# Patient Record
Sex: FEMALE | Race: WHITE | NOT HISPANIC OR LATINO | Employment: OTHER | URBAN - METROPOLITAN AREA
[De-identification: names, ages, dates, MRNs, and addresses within clinical notes are randomized per-mention and may not be internally consistent; named-entity substitution may affect disease eponyms.]

---

## 2017-12-15 ENCOUNTER — CONVERSION ENCOUNTER (OUTPATIENT)
Dept: MAMMOGRAPHY | Facility: HOSPITAL | Age: 57
End: 2017-12-15

## 2019-08-23 ENCOUNTER — HOSPITAL ENCOUNTER (OUTPATIENT)
Dept: OTHER | Facility: HOSPITAL | Age: 59
Discharge: HOME | End: 2019-08-23
Attending: NURSE PRACTITIONER

## 2019-12-13 ENCOUNTER — CONVERSION ENCOUNTER (OUTPATIENT)
Dept: SURGERY | Facility: CLINIC | Age: 59
End: 2019-12-13

## 2019-12-13 ENCOUNTER — OFFICE VISIT CONVERTED (OUTPATIENT)
Dept: SURGERY | Facility: CLINIC | Age: 59
End: 2019-12-13
Attending: PHYSICIAN ASSISTANT

## 2020-02-05 ENCOUNTER — OFFICE VISIT CONVERTED (OUTPATIENT)
Dept: UROLOGY | Facility: CLINIC | Age: 60
End: 2020-02-05
Attending: UROLOGY

## 2020-02-26 ENCOUNTER — HOSPITAL ENCOUNTER (OUTPATIENT)
Dept: PREADMISSION TESTING | Facility: HOSPITAL | Age: 60
Discharge: HOME | End: 2020-02-26
Attending: UROLOGY

## 2020-03-06 ENCOUNTER — HOSPITAL ENCOUNTER (OUTPATIENT)
Dept: PERIOP | Facility: HOSPITAL | Age: 60
Setting detail: HOSPITAL OUTPATIENT SURGERY
Discharge: HOME | End: 2020-03-06
Attending: UROLOGY

## 2020-03-11 ENCOUNTER — OFFICE VISIT CONVERTED (OUTPATIENT)
Dept: UROLOGY | Facility: CLINIC | Age: 60
End: 2020-03-11
Attending: UROLOGY

## 2020-03-11 ENCOUNTER — CONVERSION ENCOUNTER (OUTPATIENT)
Dept: SURGERY | Facility: CLINIC | Age: 60
End: 2020-03-11

## 2020-04-08 ENCOUNTER — TELEMEDICINE CONVERTED (OUTPATIENT)
Dept: UROLOGY | Facility: CLINIC | Age: 60
End: 2020-04-08
Attending: UROLOGY

## 2020-09-01 ENCOUNTER — HOSPITAL ENCOUNTER (OUTPATIENT)
Dept: OTHER | Facility: HOSPITAL | Age: 60
Discharge: HOME | End: 2020-09-01
Attending: NURSE PRACTITIONER

## 2020-09-14 ENCOUNTER — OFFICE VISIT CONVERTED (OUTPATIENT)
Dept: UROLOGY | Facility: CLINIC | Age: 60
End: 2020-09-14
Attending: UROLOGY

## 2020-11-30 ENCOUNTER — TELEPHONE CONVERTED (OUTPATIENT)
Dept: UROLOGY | Facility: CLINIC | Age: 60
End: 2020-11-30
Attending: UROLOGY

## 2021-03-19 ENCOUNTER — HOSPITAL ENCOUNTER (OUTPATIENT)
Dept: VACCINE CLINIC | Facility: HOSPITAL | Age: 61
Discharge: HOME | End: 2021-03-19
Attending: INTERNAL MEDICINE

## 2021-04-16 ENCOUNTER — HOSPITAL ENCOUNTER (OUTPATIENT)
Dept: VACCINE CLINIC | Facility: HOSPITAL | Age: 61
Discharge: HOME | End: 2021-04-16
Attending: INTERNAL MEDICINE

## 2021-05-12 NOTE — PROGRESS NOTES
Quick Note      Patient Name: Shahnaz Davis   Patient ID: [id number]   Sex: Female   YOB: 1960    Primary Care Provider: Jone Gorman MD   Referring Provider: Poly Contreras PA-C    Visit Date: April 8, 2020    Provider: Ryanne Cristobal MD   [facility]   Location Address: [address] [place] [address]  [id number]   Location Phone: [phone]          History Of Present Illness  The patient is a 60 year old /White female who returns for a routine post-operative visit after undergoing transobturator sling insertion for urinary incontinence and on 03/06/2020. Since the patient's surgery, she complains of no significant symptoms or problems since the surgery.   The patient is continent. She recently has returned to full daily activities.   TELEHEALTH VISIT  Chief Complaint: s/p transobturator sling   Shahnaz Davis is a 60 year old /White female who is presenting for evaluation via telehealth visit. Verbal consent obtained before beginning visit.   Provider spent 7 minutes with the patient during telehealth visit.   The following staff were present during this visit: Sinai Higginbotham and Ryanne Cristobal MD           Assessment  · VIDAL (stress urinary incontinence, female)     625.6/N39.3      Plan  · Orders  o Physican Telephone evaluation, 5-10 min (72048) - 625.6/N39.3 - 04/08/2020  · Medications  o Medications have been Reconciled  o Transition of Care or Provider Policy  · Instructions  o Discussion and Plan: The patient has done well after surgery with no apparent complications. I have discussed the postoperative course to date, as well as, the expected progress going forward. The patient understands what complications to be concerned about. I will see the patient in routine follow-up, or sooner if needed.  o FOLLOW-UP:  o In 3 weeks  o In 6 months  o Plan Of Care:   o Chronic conditions reviewed and taken into consideration for today's treatment  plan.  o Patient instructed to seek medical attention urgently for new or worsening symptoms.  o Patient was educated/instructed on their diagnosis, treatment and medications prior to discharge from the clinic today.  o Discussed Covid-19 precautions including, but not limited to, social distancing, avoid touching your face, and hand washing.             Electronically Signed by: Ryanne Cristobal MD -Author on April 8, 2020 12:09:42 PM

## 2021-05-13 NOTE — PROGRESS NOTES
Progress Note      Patient Name: Shahnaz Davis   Patient ID: [id number]   Sex: Female   YOB: 1960    Primary Care Provider: Jone Gorman MD   Referring Provider: Ryanne Cristobal MD    Visit Date: November 30, 2020    Provider: Ryanne Cristobal MD   Location: [facility]   Location Address: [address] [place] [address]  [id number]   Location Phone: [phone]          History Of Present Illness  The patient is a 60 year old /White female who returns for a routine post-operative visit after undergoing transobturator sling insertion for urinary incontinence and on 03/06/2020. Since the patient's surgery, she complains of the following: continued leakage with physical exertion. She states it is not any better than it was before surgery.   The patient is incontinent. The incontinence is worsening. The patient uses 2 pads a day. She states since last visit, she has returned to full daily activities.      She had UDS done and it showed stress urinary incontinence with a LPP of 87.      She is considerably bothered by her leakage and is possibly interested in another procedure.      The patient had an 8 minute phone visit today and Sinai Higginbotham was present.  Consent was obtained prior to beginning the visit.       Past Medical History  Arthritis; chronic back pain; Constipation; COVID-19, history of; VIDAL (stress urinary incontinence, female)         Past Surgical History  Appendectomy; Colonoscopy; Cystoscopy with urethral sling procedure; Hysterectomy         Medication List  Calcium 500 + D 500 mg(1,250mg) -200 unit oral tablet; estradiol 0.01 % (0.1 mg/gram) vaginal cream; melatonin oral; oxycodone-acetaminophen 5-325 mg oral tablet; Premarin 0.9 mg oral tablet; Unisom SleepGels 50 mg oral capsule; vitamin B12-folic acid 1,000-400 mcg sublingual lozenge         Allergy List  NO KNOWN DRUG ALLERGIES       Allergies Reconciled  Family Medical History  Diabetes,  unspecified type; Prostate cancer         Social History  Alcohol (Current some day); Caffeine (Current every day); Second hand smoke exposure (Never); Tobacco (Former)                 Assessment  · VIDAL (stress urinary incontinence, female)     625.6/N39.3      Plan  · Orders  o Physican Telephone evaluation, 5-10 min (58860) - 625.6/N39.3 - 11/30/2020  · Medications  o Medications have been Reconciled  o Transition of Care or Provider Policy  · Instructions  o I sent her info on coaptate periurethral injection. She will let me know if she decides she wants to do that.  o FOLLOW-UP:  o In 6 months            Electronically Signed by: Ryanne Cristobal MD -Author on November 30, 2020 03:52:24 PM

## 2021-05-13 NOTE — PROGRESS NOTES
Progress Note      Patient Name: Shahnaz Davis   Patient ID: [id number]   Sex: Female   YOB: 1960    Primary Care Provider: Jone Gorman MD   Referring Provider: Poly Conrteras PA-C    Visit Date: September 14, 2020    Provider: Ryanne Cristobal MD   Location: [facility]   Location Address: [address] [place] [address]  [id number]   Location Phone: [phone]          Chief Complaint  · pt here for urologic issues      History Of Present Illness  The patient is a 60 year old /White female who returns for a routine post-operative visit after undergoing transobturator sling insertion for urinary incontinence and on 03/06/2020. Since the patient's surgery, she complains of the following: continued leakage with physical exertion. She states it is not any better than it was before surgery.   The patient is incontinent. The incontinence is worsening. The patient uses 2 pads a day. She states since last visit, she has returned to full daily activities.       Past Medical History  Arthritis; chronic back pain; Constipation; VIDAL (stress urinary incontinence, female)         Past Surgical History  Appendectomy; Colonoscopy; Cystoscopy with urethral sling procedure; Hysterectomy         Medication List  estradiol 0.01 % (0.1 mg/gram) vaginal cream; melatonin oral; oxycodone-acetaminophen 5-325 mg oral tablet; Premarin 0.9 mg oral tablet; Unisom SleepGels 50 mg oral capsule; vitamin B12-folic acid 1,000-400 mcg sublingual lozenge         Allergy List  NO KNOWN DRUG ALLERGIES       Allergies Reconciled  Family Medical History  Diabetes, unspecified type; Prostate cancer         Social History  Alcohol (Current some day); Caffeine (Current every day); Second hand smoke exposure (Never); Tobacco (Former)         Review of Systems  · Constitutional  o Denies  o : chills, fever  · Gastrointestinal  o Denies  o : nausea, vomiting, flank pain      Vitals  Date Time BP Position Site L\R  "Cuff Size HR RR TEMP (F) WT  HT  BMI kg/m2 BSA m2 O2 Sat HC       09/14/2020 09:58 /75 Sitting       137lbs 6oz 5'  2\" 25.13 1.65           Physical Examination  · Constitutional  o Appearance  o : well-nourished, well developed, alert, in no acute distress, normal body habitus          Results  · In-Office Procedures  o Lab procedure  § Automated dipstick urinalysis with microscopy (22665)   § Color Ur: Yellow   § Clarity Ur: Clear   § Glucose Ur Ql Strip: Negative   § Bilirub Ur Ql Strip: Negative   § Ketones Ur Ql Strip: Negative   § Sp Gr Ur Qn: 1.020   § Hgb Ur Ql Strip: Trace-intact   § pH Ur-LsCnc: 5.5   § Prot Ur Ql Strip: Negative   § Urobilinogen Ur Strip-mCnc: 0.2   § Nitrite Ur Ql Strip: Negative   § WBC Est Ur Ql Strip: Negative   § RBC UrnS Qn HPF: 0   § WBC UrnS Qn HPF: 0   § Bacteria UrnS Qn HPF: 0   § Crystals UrnS Qn HPF: 0   § Epithelial Cells (non renal): 0 /HPF  § Epithelial Cells (renal): 0       Assessment  · VIDAL (stress urinary incontinence, female)     625.6/N39.3      Plan  · Orders  o Complex cystometrogram with voiding pressure studies and UPP (46606) - 625.6/N39.3 - 09/14/2020  o Intraabdominal Pressure Test (59557) - 625.6/N39.3 - 09/14/2020  o Electromyography Anal Sphinct. (84298) - 625.6/N39.3 - 09/14/2020  · Medications  o Medications have been Reconciled  o Transition of Care or Provider Policy  · Instructions  o She will have UDS done and I will see her back after that.  o Electronically Identified Patient Education Materials Provided Electronically            Electronically Signed by: Ryanne Cristobal MD -Author on September 14, 2020 12:32:32 PM  "

## 2021-05-14 VITALS
BODY MASS INDEX: 25.28 KG/M2 | WEIGHT: 137.37 LBS | SYSTOLIC BLOOD PRESSURE: 116 MMHG | DIASTOLIC BLOOD PRESSURE: 75 MMHG | HEIGHT: 62 IN

## 2021-05-15 VITALS
WEIGHT: 136 LBS | HEIGHT: 62 IN | BODY MASS INDEX: 25.03 KG/M2 | DIASTOLIC BLOOD PRESSURE: 73 MMHG | SYSTOLIC BLOOD PRESSURE: 122 MMHG

## 2021-05-15 VITALS
WEIGHT: 136 LBS | BODY MASS INDEX: 25.03 KG/M2 | HEIGHT: 62 IN | SYSTOLIC BLOOD PRESSURE: 119 MMHG | DIASTOLIC BLOOD PRESSURE: 83 MMHG

## 2021-05-15 VITALS — RESPIRATION RATE: 12 BRPM | WEIGHT: 134.5 LBS | HEIGHT: 62 IN | BODY MASS INDEX: 24.75 KG/M2

## 2021-08-25 ENCOUNTER — TRANSCRIBE ORDERS (OUTPATIENT)
Dept: ADMINISTRATIVE | Facility: HOSPITAL | Age: 61
End: 2021-08-25

## 2021-08-25 DIAGNOSIS — Z12.31 VISIT FOR SCREENING MAMMOGRAM: Primary | ICD-10-CM

## 2021-09-13 ENCOUNTER — HOSPITAL ENCOUNTER (OUTPATIENT)
Dept: MAMMOGRAPHY | Facility: HOSPITAL | Age: 61
Discharge: HOME | End: 2021-09-13
Admitting: NURSE PRACTITIONER

## 2021-09-13 DIAGNOSIS — Z12.31 VISIT FOR SCREENING MAMMOGRAM: ICD-10-CM

## 2021-09-13 PROCEDURE — 77067 SCR MAMMO BI INCL CAD: CPT

## 2021-09-21 NOTE — TELEPHONE ENCOUNTER
Rx Refill Note  Requested Prescriptions     Pending Prescriptions Disp Refills   • Premarin 0.9 MG tablet [Pharmacy Med Name: PREMARIN TAB 0.9MG] 90 tablet 0     Sig: TAKE 1 TABLET DAILY. NOT TOTAKE THE PREMARIN 0.625 ANDTHIS      Last office visit with prescribing clinician: LAST VISIT 8-12-20 RX GIVEN @ THAT TIME WITH NO REFILLS/ PT CLD IN ON 8-19-20 REFILL GIVEN X 1 YEAR        Next office visit with prescribing clinician: Visit date not found            Preethi Muse MA  09/21/21, 08:44 EDT

## 2021-09-24 RX ORDER — ESTROGENS, CONJUGATED 0.9 MG
TABLET ORAL
Qty: 90 TABLET | Refills: 0 | OUTPATIENT
Start: 2021-09-24

## 2021-10-01 ENCOUNTER — OFFICE VISIT (OUTPATIENT)
Dept: OBSTETRICS AND GYNECOLOGY | Facility: CLINIC | Age: 61
End: 2021-10-01

## 2021-10-01 VITALS
HEART RATE: 78 BPM | HEIGHT: 62 IN | DIASTOLIC BLOOD PRESSURE: 76 MMHG | WEIGHT: 138 LBS | SYSTOLIC BLOOD PRESSURE: 108 MMHG | BODY MASS INDEX: 25.4 KG/M2

## 2021-10-01 DIAGNOSIS — Z01.419 PAP TEST, AS PART OF ROUTINE GYNECOLOGICAL EXAMINATION: Primary | ICD-10-CM

## 2021-10-01 LAB
GLUCOSE UR STRIP-MCNC: NEGATIVE MG/DL
PROT UR STRIP-MCNC: NEGATIVE MG/DL

## 2021-10-01 PROCEDURE — 81002 URINALYSIS NONAUTO W/O SCOPE: CPT | Performed by: NURSE PRACTITIONER

## 2021-10-01 PROCEDURE — 99396 PREV VISIT EST AGE 40-64: CPT | Performed by: NURSE PRACTITIONER

## 2021-10-01 RX ORDER — ESTRADIOL 0.1 MG/G
1 CREAM VAGINAL
COMMUNITY
End: 2021-10-01 | Stop reason: SDUPTHER

## 2021-10-01 RX ORDER — METHOCARBAMOL 500 MG/1
TABLET, FILM COATED ORAL
COMMUNITY
End: 2022-10-21

## 2021-10-01 RX ORDER — ESTRADIOL 0.1 MG/G
1 CREAM VAGINAL 2 TIMES WEEKLY
Qty: 1 EACH | Refills: 3 | Status: SHIPPED | OUTPATIENT
Start: 2021-10-04 | End: 2022-01-02

## 2021-10-01 RX ORDER — LANOLIN ALCOHOL/MO/W.PET/CERES
CREAM (GRAM) TOPICAL DAILY
COMMUNITY

## 2021-10-01 RX ORDER — OXYCODONE HYDROCHLORIDE AND ACETAMINOPHEN 5; 325 MG/1; MG/1
TABLET ORAL
COMMUNITY
Start: 2021-08-31

## 2021-10-01 RX ORDER — CHLORHEXIDINE GLUCONATE 0.12 MG/ML
RINSE ORAL
COMMUNITY
Start: 2021-09-14 | End: 2022-10-21

## 2021-10-01 RX ORDER — AMOXICILLIN 250 MG
CAPSULE ORAL
COMMUNITY
End: 2022-10-21

## 2021-10-01 RX ORDER — NALOXONE HYDROCHLORIDE 4 MG/.1ML
4 SPRAY NASAL
COMMUNITY
Start: 2021-06-22

## 2021-10-01 RX ORDER — DIPHENHYDRAMINE HCL 50 MG
CAPSULE ORAL
COMMUNITY

## 2021-10-01 NOTE — PROGRESS NOTES
"  HPI:   61 y.o..Presents for well woman exam. Contraception or HRT: TVH  secondary to fibroids  Menses:   No VB   Pt has no complaints today. Desires RF HRT for management of menopause symptoms, insomnia, hot flashes, vaginal dryness. Doing well on current treatment.    Past Medical History:   Diagnosis Date   • Arthritis    • Back pain       Past Surgical History:   Procedure Laterality Date   • TUBAL ABDOMINAL LIGATION     • VAGINAL HYSTERECTOMY      SECONDARY TO FIBROID TUMORS      Family History   Problem Relation Age of Onset   • Diabetes Mother    • Heart disease Father    • Hyperlipidemia Father    • Heart disease Paternal Grandfather         PCP: Dr. Banegas      PHYSICAL EXAM:  /76   Pulse 78   Ht 157.5 cm (62\")   Wt 62.6 kg (138 lb)   Breastfeeding No   BMI 25.24 kg/m²   General- NAD, alert and oriented, appropriate  Psych- Normal mood, good memory  Neck- No masses, no thyroid enlargement  Lymphatic- No palpable neck, axillary, or groin nodes  CV- Regular rhythm, no murnurs  Resp- CTA to bases, no wheezes  Abdomen- Soft, non distended, non tender, no masses  Breast left-  Bilaterally symmetrical, no masses, non tender, no nipple discharge  Breast right- Bilaterally symmetrical, no masses, non tender, no nipple discharge  External genitalia- Normal female, no lesions  Urethra/meatus- Normal, no masses, non tender, no prolapse  Bladder- Normal, no masses, non tender, no prolapse  Vagina- Normal, no atrophy, no lesions, no discharge, no prolapse  Cvx- Absent  Uterus- Absent  Adnexa- No mass, non tender  Anus/Rectum/Perineum- Declined  Ext- No edema, no cyanosis    Skin- No lesions, no rashes, no acanthosis nigricans    ASSESSMENT and PLAN:  WWE    Diagnoses and all orders for this visit:    1. Pap test, as part of routine gynecological examination (Primary)  -     Cancel: POC Urinalysis Dipstick  -     POC Urinalysis Dipstick    Other orders  -     estradiol (ESTRACE) 0.1 MG/GM " vaginal cream; Insert 1 g into the vagina 2 (Two) Times a Week for 90 days.  Dispense: 1 each; Refill: 3  -     Discontinue: estrogens, conjugated, (Premarin) 0.9 MG tablet; Take 1 tablet by mouth Daily for 90 days. Take daily for 21 days then do not take for 7 days.  Dispense: 90 tablet; Refill: 3  -     estrogens, conjugated, (Premarin) 0.9 MG tablet; Take 1 tablet by mouth Daily for 90 days. Take one tablet daily  Dispense: 90 tablet; Refill: 3         Glucose, UA   Date Value Ref Range Status   10/01/2021 Negative Negative, 1000 mg/dL (3+) mg/dL Final      Protein, POC   Date Value Ref Range Status   10/01/2021 Negative Negative mg/dL Final        Counseling:     HRT R/B/A/SE/E all options including non-FDA approved options reviewed  Calcium and Vitamin D supplementation, Weightbearing exercise    Preventative:   BREAST HEALTH- Monthly self breast exam importance and how to reviewed. MMG and/or MRI (prn) reviewed per society guidelines and her individual history. Screen: Updated today.  COLON CANCER SCREENING- Reviewed current medical society guidelines and options.  Screen:  Already up to date.  BONE HEALTH- Reviewed current medical society guidelines and options for testing, calcium and vit D intake.  Weight bearing exercise.  DEXA: Already up to date, Pt declines.  She has been educated as to my strong recommendation for this screening test.  Without testing she understands the risk of increased morbidity and mortality.  She is well informed, her questions have been answered and she still declines..  Follow up PCP/Specialist PMHx and Labs  Myriad: Does not qualify.    She understands the importance of having any ordered tests to be performed in a timely fashion.  The risks of not performing them include, but are not limited to, advanced cancer stages, bone loss from osteoporosis and/or subsequent increase in morbidity and/or mortality.  She is encouraged to review her results online and/or contact or office  if she has questions.     Follow Up:  Return as needed.    Alida Garzon, APRN  10/01/2021

## 2022-08-10 ENCOUNTER — TRANSCRIBE ORDERS (OUTPATIENT)
Dept: ADMINISTRATIVE | Facility: HOSPITAL | Age: 62
End: 2022-08-10

## 2022-08-10 DIAGNOSIS — Z12.31 ENCOUNTER FOR SCREENING MAMMOGRAM FOR MALIGNANT NEOPLASM OF BREAST: Primary | ICD-10-CM

## 2022-09-19 ENCOUNTER — APPOINTMENT (OUTPATIENT)
Dept: MAMMOGRAPHY | Facility: HOSPITAL | Age: 62
End: 2022-09-19

## 2022-09-26 ENCOUNTER — HOSPITAL ENCOUNTER (OUTPATIENT)
Dept: MAMMOGRAPHY | Facility: HOSPITAL | Age: 62
Discharge: HOME | End: 2022-09-26
Admitting: FAMILY MEDICINE

## 2022-09-26 DIAGNOSIS — Z12.31 ENCOUNTER FOR SCREENING MAMMOGRAM FOR MALIGNANT NEOPLASM OF BREAST: ICD-10-CM

## 2022-09-26 PROCEDURE — 77067 SCR MAMMO BI INCL CAD: CPT

## 2022-10-14 ENCOUNTER — TELEPHONE (OUTPATIENT)
Dept: OBSTETRICS AND GYNECOLOGY | Facility: CLINIC | Age: 62
End: 2022-10-14

## 2022-10-14 DIAGNOSIS — E89.40 POSTSURGICAL MENOPAUSE: Primary | ICD-10-CM

## 2022-10-14 NOTE — TELEPHONE ENCOUNTER
Fax request received from [facility] for Premarin 0.9mg tabs.  Patient has annual exam scheduled.

## 2022-10-21 ENCOUNTER — OFFICE VISIT (OUTPATIENT)
Dept: OBSTETRICS AND GYNECOLOGY | Facility: CLINIC | Age: 62
End: 2022-10-21

## 2022-10-21 VITALS
BODY MASS INDEX: 23.55 KG/M2 | DIASTOLIC BLOOD PRESSURE: 77 MMHG | SYSTOLIC BLOOD PRESSURE: 110 MMHG | HEIGHT: 62 IN | HEART RATE: 76 BPM | WEIGHT: 128 LBS

## 2022-10-21 DIAGNOSIS — Z12.11 SCREENING FOR COLON CANCER: ICD-10-CM

## 2022-10-21 DIAGNOSIS — N95.2 VAGINAL ATROPHY: ICD-10-CM

## 2022-10-21 DIAGNOSIS — Z01.419 WELL WOMAN EXAM: Primary | ICD-10-CM

## 2022-10-21 PROCEDURE — 99396 PREV VISIT EST AGE 40-64: CPT | Performed by: NURSE PRACTITIONER

## 2022-10-21 RX ORDER — ESTRADIOL 0.1 MG/G
1 CREAM VAGINAL 3 TIMES WEEKLY
Qty: 42.5 G | Refills: 3 | Status: SHIPPED | OUTPATIENT
Start: 2022-10-21 | End: 2022-12-20

## 2022-10-21 RX ORDER — MULTIPLE VITAMINS W/ MINERALS TAB 9MG-400MCG
TAB ORAL
COMMUNITY

## 2022-10-21 NOTE — PROGRESS NOTES
"  HPI:   62 y.o. . Presents for well woman exam. Contraception or HRT: Post menopausal, using HRT, s/p HYST BSO, benign indications, vaginal estrogen therapy  Menses:   No vaginal bleeding  Pain:  None  Last pap normal   Complaints: Discontinued systemic hrt 2 weeks ago after running out, no symptoms, desires to remain off systemic hrt  Patient reports that she not currently experiences stress urinary incontinence s/p bladder sling- failed. No pain urination. Satisfied with incontinence pads, not interested in pelvic floor rehab.    Past Medical History:   Diagnosis Date   • Arthritis    • Back pain    • Fibroid       Past Surgical History:   Procedure Laterality Date   • TUBAL ABDOMINAL LIGATION     • VAGINAL HYSTERECTOMY      SECONDARY TO FIBROID TUMORS, ovaries remain      Family History   Problem Relation Age of Onset   • Diabetes Mother    • Heart disease Father    • Hyperlipidemia Father    • Heart disease Paternal Grandfather      Allergies as of 10/21/2022   • (No Known Allergies)        PCP: does manage PMHx and preventative labs    /77   Pulse 76   Ht 157.5 cm (62\")   Wt 58.1 kg (128 lb)   Breastfeeding No   BMI 23.41 kg/m²     PHYSICAL EXAM: Chaperone present   General- NAD, alert and oriented, appropriate  Psych- Normal mood, good memory  Neck- No masses, no thyroid enlargement  Lymphatic- No palpable neck, axillary, or groin nodes  CV- Regular rhythm, no murmurs  Resp- CTA to bases, no wheezes  Abdomen- Soft, non distended, non tender, no masses  Breast left-  Bilaterally symmetrical, no masses, non tender, no nipple discharge  Breast right- Bilaterally symmetrical, no masses, non tender, no nipple discharge  External genitalia- Normal female, no lesions  Urethra/meatus- Normal, no masses, non tender, no prolapse  Bladder- Normal, no masses, non tender, no prolapse  Vagina- Atrophic, no lesions, no discharge, no prolapse  Cvx- Absent  Uterus- Absent  Adnexa- " Absent  Anus/Rectum/Perineum- Not performed  Ext- No edema, no cyanosis    Skin- No lesions, no rashes, no acanthosis nigricans       ASSESSMENT and PLAN:    Diagnoses and all orders for this visit:    1. Well woman exam (Primary)    2. Screening for colon cancer  -     Ambulatory Referral For Screening Colonoscopy    3. Vaginal atrophy  -     estradiol (ESTRACE) 0.1 MG/GM vaginal cream; Insert 1 g into the vagina 3 (Three) Times a Week for 60 days.  Dispense: 42.5 g; Refill: 3      Preventative:   BREAST HEALTH- Monthly self breast exam importance and how to reviewed. MMG and/or MRI (prn) reviewed per society guidelines and her individual history. Screen: Already up to date  CERVICAL CANCER Screening- Reviewed current ASCCP guidelines for screening w and wo cotest HPV, age specific.  Screen: Not medically needed  COLON CANCER Screening- Reviewed current medical society guidelines and options.  Screen:  Already up to date  SEXUAL HEALTH: Declines STD screening  BONE HEALTH- Reviewed current medical society guidelines and options for testing, calcium and vit D intake.  Weight bearing exercise.  DEXA: Already up to date  VACCINATIONS Recommended: Zoster (51yo and older).  Importance discussed, risk being unvaccinated reviewed.  Questions answered  Smoking status- NON SMOKER  Follow up PCP/Specialist PMHx and Labs  Myriad: Does not qualify.  HRT R/B/A/SE/E all options including non-FDA approved options discussed w pt.         HRT- I recommend the lowest dose possible, for the shortest period of time.  Risks HRT NLT breast CA (progestin), CVA, MI, EMILIE.    Desires to continue  vaginal estrogen therapy      She understands the importance of having any ordered tests to be performed in a timely fashion.  The risks of not performing them include, but are not limited to, advanced cancer stages, bone loss from osteoporosis and/or subsequent increase in morbidity and/or mortality.  She is encouraged to review her results online  and/or contact or office if she has questions.     Follow Up:  Return in about 1 year (around 10/21/2023).        Alida Garzon, APRN  10/21/2022

## 2022-12-02 ENCOUNTER — TELEPHONE (OUTPATIENT)
Dept: OBSTETRICS AND GYNECOLOGY | Facility: CLINIC | Age: 62
End: 2022-12-02

## 2022-12-02 NOTE — TELEPHONE ENCOUNTER
HUB To Read:  The patient had left a voicemail message requesting a lower dose on her premarin medication.  I need clarification from the patient so that I can send a message to her provider, because in her chart that medication was discontinued.  I called her and left a voicemail message for her to return my phone call.

## 2022-12-02 NOTE — TELEPHONE ENCOUNTER
Sent script for premarin .45 mg which is half the dose she was taking prior, we can titrate up or down depending on her symptoms, recommend give 6 weeks, let me know if she is not happy with current dose.

## 2022-12-02 NOTE — TELEPHONE ENCOUNTER
The patient called and said that at her last visit she had told you she did not need her Premarin 0.9 tablets anymore because she was doing better.  She however wants to know if you will send a prescription to her pharmacy because she is now having the same issues with hot flashes and irritability.  She also asked if there is a little bit lower dose that she can try besides the 0.9.  Her pharmacy is [facility].  Thank you.

## 2022-12-02 NOTE — TELEPHONE ENCOUNTER
I called the patient and left a detailed message, per her request, regarding this information and told her to call us if she has any further questions.

## 2023-05-23 ENCOUNTER — TELEPHONE (OUTPATIENT)
Dept: OBSTETRICS AND GYNECOLOGY | Facility: CLINIC | Age: 63
End: 2023-05-23
Payer: COMMERCIAL

## 2023-05-23 NOTE — TELEPHONE ENCOUNTER
Called patient left message.  Called Nahomi @ [facility] and she states Rx for Premarin 0.45mg 90 day supply is there.  She stated patient needs to call to get refill.

## 2023-05-23 NOTE — TELEPHONE ENCOUNTER
Patient called back states they will not give her a 90 day supply without another Rx.  Called [facility] Sadi gave a verbal order for Premarin 0.45mg # 90 with 2 refills. 1 po qd.  Informed patient.

## 2023-05-23 NOTE — TELEPHONE ENCOUNTER
Patient returned my call.  Informed her per Nahomi Rx was @ [facility] gave her the telephone number okay'd per Nahomi to call and get her refill

## 2023-08-23 ENCOUNTER — TRANSCRIBE ORDERS (OUTPATIENT)
Dept: ADMINISTRATIVE | Facility: HOSPITAL | Age: 63
End: 2023-08-23
Payer: COMMERCIAL

## 2023-08-23 DIAGNOSIS — Z12.31 SCREENING MAMMOGRAM, ENCOUNTER FOR: Primary | ICD-10-CM

## 2023-09-27 ENCOUNTER — HOSPITAL ENCOUNTER (OUTPATIENT)
Dept: MAMMOGRAPHY | Facility: HOSPITAL | Age: 63
Discharge: HOME | End: 2023-09-27
Admitting: FAMILY MEDICINE
Payer: COMMERCIAL

## 2023-09-27 DIAGNOSIS — Z12.31 SCREENING MAMMOGRAM, ENCOUNTER FOR: ICD-10-CM

## 2023-09-27 PROCEDURE — 77067 SCR MAMMO BI INCL CAD: CPT

## 2023-09-27 PROCEDURE — 77063 BREAST TOMOSYNTHESIS BI: CPT

## 2023-10-26 NOTE — PROGRESS NOTES
"  HPI:   63 y.o. . Presents for well woman exam. Post menopausal, using HRT, s/p HYST, still has one or both ovaries, benign indications  Desires refills HRT premarin for hot flashes, no hot flashes, desires to continue without taper. Desires to continue vaginal estrogen for management of vaginal dryness  Menses:   None  Pain:  None  Last pap: normal   Complaints: 2 weeks ago developed a discharge- yellow with slight odor, used otc monistat and symptoms have improved  Patient reports that she is experiencing  urinary incontinence, primarily with cough and sneeze, previously had bladder sling surgery. Does not desire further management.      Past Medical History:   Diagnosis Date    Arthritis     Back pain     Fibroid       Past Surgical History:   Procedure Laterality Date    APPENDECTOMY      BLADDER SUSPENSION          COLONOSCOPY  2016    TUBAL ABDOMINAL LIGATION      VAGINAL HYSTERECTOMY      SECONDARY TO FIBROID TUMORS, ovaries remain      Family History   Problem Relation Age of Onset    Heart disease Father     Hyperlipidemia Father     Diabetes Mother     Heart disease Paternal Grandfather     Breast cancer Neg Hx     Ovarian cancer Neg Hx     Uterine cancer Neg Hx     Prostate cancer Neg Hx     Colon cancer Neg Hx      Allergies as of 10/30/2023    (No Known Allergies)        PCP: does manage PMHx and preventative labs    /69   Pulse 81   Ht 157.5 cm (62\")   Wt 58.5 kg (129 lb)   BMI 23.59 kg/m²     PHYSICAL EXAM: Chaperone present   General- NAD, alert and oriented, appropriate  Psych- Normal mood, good memory  Neck- No masses, no thyroid enlargement  Lymphatic- No palpable neck, axillary, or groin nodes  CV- Regular rhythm, no murmurs  Resp- CTA to bases, no wheezes  Abdomen- Soft, non distended, non tender, no masses  Breast left-  Bilaterally symmetrical, no masses, non tender, no nipple discharge  Breast right- Bilaterally symmetrical, no masses, non tender, no nipple " discharge  External genitalia- Normal female, no lesions  Atrophy and Mild  Urethra/meatus- Normal, no masses, non tender, no prolapse  Bladder- Normal, no masses, non tender, no prolapse  Vagina- Normal, mild atrophy, no lesions, moderate amount thick white discharge, no prolapse  Cvx- Absent  Uterus- Absent  Adnexa- No mass, non tender  Anus/Rectum/Perineum- Not performed  Ext- No edema, no cyanosis    Skin- No lesions, no rashes, no acanthosis nigricans       ASSESSMENT and PLAN:    Diagnoses and all orders for this visit:    1. Well woman exam (Primary)    2. Postsurgical menopause  -     estrogens, conjugated, (Premarin) 0.45 MG tablet; Take 1 tablet by mouth Daily. Take daily for 21 days then do not take for 7 days.  Dispense: 90 tablet; Refill: 3  -     estradiol (ESTRACE) 0.1 MG/GM vaginal cream; Insert 1 g into the vagina 2 (Two) Times a Week.  Dispense: 42.5 g; Refill: 3    3. Genitourinary syndrome of menopause  -     estradiol (ESTRACE) 0.1 MG/GM vaginal cream; Insert 1 g into the vagina 2 (Two) Times a Week.  Dispense: 42.5 g; Refill: 3    4. Acute vaginitis  -     Gardnerella vaginalis, Trichomonas vaginalis, Candida albicans, DNA - Swab, Vagina      Preventative:   BREAST HEALTH- Monthly self breast exam importance and how to reviewed. MMG and/or MRI (prn) reviewed per society guidelines and her individual history. Screening Imaging: Already up to date  CERVICAL CANCER Screening- Reviewed current ASCCP guidelines for screening w and wo cotest HPV, age specific.  Screen: Not medically needed  COLON CANCER Screening- Reviewed current medical society guidelines and options.  Screen:  Already up to date  SEXUAL HEALTH: Declines STD screening  BONE HEALTH- Reviewed current medical society guidelines and options for testing, calcium and vit D intake.  Weight bearing exercise.  DEXA: Pt declines.  She has been educated as to my strong recommendation for this screening test.  Without testing she understands  the risk of increased morbidity and mortality.  She is well informed, her questions have been answered and she still declines.  VACCINATIONS Recommended: Up to date  Importance discussed, risk being unvaccinated reviewed.  Questions answered  Genetic testing: Does not qualify.  Smoking status- NON SMOKER  Follow up PCP/Specialist PMHx and Labs     She understands the importance of having any ordered tests to be performed in a timely fashion.  The risks of not performing them include, but are not limited to, advanced cancer stages, bone loss from osteoporosis and/or subsequent increase in morbidity and/or mortality.  She is encouraged to review her results online and/or contact or office if she has questions.     Follow Up:  Return in about 1 year (around 10/30/2024).        Alida Garzon, APRN  10/30/2023

## 2023-10-30 ENCOUNTER — OFFICE VISIT (OUTPATIENT)
Dept: OBSTETRICS AND GYNECOLOGY | Facility: CLINIC | Age: 63
End: 2023-10-30
Payer: COMMERCIAL

## 2023-10-30 VITALS
HEIGHT: 62 IN | HEART RATE: 81 BPM | WEIGHT: 129 LBS | DIASTOLIC BLOOD PRESSURE: 69 MMHG | SYSTOLIC BLOOD PRESSURE: 105 MMHG | BODY MASS INDEX: 23.74 KG/M2

## 2023-10-30 DIAGNOSIS — N76.0 ACUTE VAGINITIS: ICD-10-CM

## 2023-10-30 DIAGNOSIS — Z01.419 WELL WOMAN EXAM: Primary | ICD-10-CM

## 2023-10-30 DIAGNOSIS — E89.40 POSTSURGICAL MENOPAUSE: ICD-10-CM

## 2023-10-30 DIAGNOSIS — N95.8 GENITOURINARY SYNDROME OF MENOPAUSE: ICD-10-CM

## 2023-10-30 LAB
CANDIDA SPECIES: POSITIVE
GARDNERELLA VAGINALIS: POSITIVE
T VAGINALIS DNA VAG QL PROBE+SIG AMP: NEGATIVE

## 2023-10-30 PROCEDURE — 87480 CANDIDA DNA DIR PROBE: CPT | Performed by: NURSE PRACTITIONER

## 2023-10-30 PROCEDURE — 87510 GARDNER VAG DNA DIR PROBE: CPT | Performed by: NURSE PRACTITIONER

## 2023-10-30 PROCEDURE — 87660 TRICHOMONAS VAGIN DIR PROBE: CPT | Performed by: NURSE PRACTITIONER

## 2023-10-30 RX ORDER — SENNOSIDES 8.6 MG
650 CAPSULE ORAL EVERY 8 HOURS PRN
COMMUNITY

## 2023-10-30 RX ORDER — ESTRADIOL 0.1 MG/G
1 CREAM VAGINAL 2 TIMES WEEKLY
Qty: 42.5 G | Refills: 3 | Status: SHIPPED | OUTPATIENT
Start: 2023-10-30

## 2023-10-31 ENCOUNTER — TELEPHONE (OUTPATIENT)
Dept: OBSTETRICS AND GYNECOLOGY | Facility: CLINIC | Age: 63
End: 2023-10-31
Payer: COMMERCIAL

## 2023-10-31 DIAGNOSIS — N76.0 BV (BACTERIAL VAGINOSIS): Primary | ICD-10-CM

## 2023-10-31 DIAGNOSIS — B96.89 BV (BACTERIAL VAGINOSIS): Primary | ICD-10-CM

## 2023-10-31 DIAGNOSIS — B37.31 CANDIDA VAGINITIS: ICD-10-CM

## 2023-10-31 RX ORDER — FLUCONAZOLE 100 MG/1
100 TABLET ORAL
Qty: 2 TABLET | Refills: 0 | Status: SHIPPED | OUTPATIENT
Start: 2023-10-31 | End: 2023-11-04

## 2023-10-31 RX ORDER — METRONIDAZOLE 7.5 MG/G
GEL VAGINAL
Qty: 70 G | Refills: 0 | Status: SHIPPED | OUTPATIENT
Start: 2023-10-31 | End: 2023-11-05

## 2023-10-31 NOTE — TELEPHONE ENCOUNTER
----- Message from NEDRA Raymond sent at 10/31/2023 12:28 PM EDT -----  BV and yeast detected, treatment sent to pharmacy,follow up for persistent or worsening symtoms

## 2024-07-09 ENCOUNTER — TELEPHONE (OUTPATIENT)
Dept: OBSTETRICS AND GYNECOLOGY | Facility: CLINIC | Age: 64
End: 2024-07-09
Payer: COMMERCIAL

## 2024-07-09 DIAGNOSIS — E89.40 POSTSURGICAL MENOPAUSE: ICD-10-CM

## 2024-07-17 ENCOUNTER — TELEPHONE (OUTPATIENT)
Dept: OBSTETRICS AND GYNECOLOGY | Facility: CLINIC | Age: 64
End: 2024-07-17
Payer: COMMERCIAL

## 2024-07-17 DIAGNOSIS — E89.40 POSTSURGICAL MENOPAUSE: ICD-10-CM

## 2024-07-17 NOTE — TELEPHONE ENCOUNTER
Okay'd refill on Premarin # 90 with 1 refill.  Orginally sent directions was messed up stated to take 21 days not take for 7 days.  Pharmacy didn't fill 90 day supply filled #68

## 2024-08-22 ENCOUNTER — TRANSCRIBE ORDERS (OUTPATIENT)
Dept: ADMINISTRATIVE | Facility: HOSPITAL | Age: 64
End: 2024-08-22
Payer: COMMERCIAL

## 2024-08-22 DIAGNOSIS — Z12.31 VISIT FOR SCREENING MAMMOGRAM: Primary | ICD-10-CM

## 2024-09-27 ENCOUNTER — HOSPITAL ENCOUNTER (OUTPATIENT)
Dept: MAMMOGRAPHY | Facility: HOSPITAL | Age: 64
Discharge: HOME | End: 2024-09-27
Payer: COMMERCIAL

## 2024-09-27 DIAGNOSIS — Z12.31 VISIT FOR SCREENING MAMMOGRAM: ICD-10-CM

## 2024-10-03 ENCOUNTER — HOSPITAL ENCOUNTER (OUTPATIENT)
Dept: MAMMOGRAPHY | Facility: HOSPITAL | Age: 64
Discharge: HOME | End: 2024-10-03
Admitting: FAMILY MEDICINE
Payer: COMMERCIAL

## 2024-10-03 PROCEDURE — 77063 BREAST TOMOSYNTHESIS BI: CPT

## 2024-10-03 PROCEDURE — 77067 SCR MAMMO BI INCL CAD: CPT

## 2024-10-08 ENCOUNTER — TRANSCRIBE ORDERS (OUTPATIENT)
Dept: ADMINISTRATIVE | Facility: HOSPITAL | Age: 64
End: 2024-10-08
Payer: COMMERCIAL

## 2024-10-08 DIAGNOSIS — Z12.31 BREAST CANCER SCREENING BY MAMMOGRAM: ICD-10-CM

## 2024-10-08 DIAGNOSIS — R92.8 ABNORMAL MAMMOGRAM: Primary | ICD-10-CM

## 2024-10-22 ENCOUNTER — HOSPITAL ENCOUNTER (OUTPATIENT)
Dept: MAMMOGRAPHY | Facility: HOSPITAL | Age: 64
Discharge: HOME | End: 2024-10-22
Admitting: FAMILY MEDICINE
Payer: COMMERCIAL

## 2024-10-22 ENCOUNTER — APPOINTMENT (OUTPATIENT)
Dept: ULTRASOUND IMAGING | Facility: HOSPITAL | Age: 64
End: 2024-10-22
Payer: COMMERCIAL

## 2024-10-22 DIAGNOSIS — N64.89 BREAST ASYMMETRY: ICD-10-CM

## 2024-10-22 DIAGNOSIS — Z12.31 BREAST CANCER SCREENING BY MAMMOGRAM: ICD-10-CM

## 2024-10-22 PROCEDURE — 77065 DX MAMMO INCL CAD UNI: CPT

## 2024-10-22 PROCEDURE — G0279 TOMOSYNTHESIS, MAMMO: HCPCS

## 2024-11-21 DIAGNOSIS — E89.40 POSTSURGICAL MENOPAUSE: ICD-10-CM

## 2024-11-21 DIAGNOSIS — N95.8 GENITOURINARY SYNDROME OF MENOPAUSE: ICD-10-CM

## 2024-11-21 RX ORDER — ESTRADIOL 0.1 MG/G
CREAM VAGINAL
Qty: 42.5 G | Refills: 0 | Status: SHIPPED | OUTPATIENT
Start: 2024-11-21

## 2025-02-03 DIAGNOSIS — E89.40 POSTSURGICAL MENOPAUSE: ICD-10-CM

## 2025-02-03 NOTE — TELEPHONE ENCOUNTER
Caller: Shahnaz Davis    Relationship: Self    Best call back number: [phone]    Requested Prescriptions:   Requested Prescriptions     Pending Prescriptions Disp Refills    estrogens, conjugated, (Premarin) 0.45 MG tablet 90 tablet 1     Si po daily    PT STATED PLEASE MAKE SURE IT IS WRITTEN AS TO BE TAKEN DAILY    [facility] - [address] [facility] AT [address] TO [facility] - [phone]  - [phone] FX     Last office visit with prescribing clinician: 10/30/2023     Next office visit with prescribing clinician: 2025     Additional details provided by patient: PT NEEDS REFILL SENT.    Does the patient have less than a 3 day supply:  [] Yes  [x] No    Would you like a call back once the refill request has been completed: [x] Yes [] No    If the office needs to give you a call back, can they leave a voicemail: [x] Yes [] No    Sivakumar Crockett   25 11:28 EST

## 2025-02-04 NOTE — TELEPHONE ENCOUNTER
Okay'd refill on Premarin x 1 # 90.  Last seen 10/30/23.  Next appointment 2/27/25.  Called patient left message Rx @ pharmacy

## 2025-02-27 ENCOUNTER — OFFICE VISIT (OUTPATIENT)
Dept: OBSTETRICS AND GYNECOLOGY | Facility: CLINIC | Age: 65
End: 2025-02-27
Payer: MEDICARE

## 2025-02-27 VITALS
BODY MASS INDEX: 23.41 KG/M2 | WEIGHT: 128 LBS | DIASTOLIC BLOOD PRESSURE: 82 MMHG | HEART RATE: 80 BPM | SYSTOLIC BLOOD PRESSURE: 128 MMHG

## 2025-02-27 DIAGNOSIS — Z12.31 ENCOUNTER FOR SCREENING MAMMOGRAM FOR MALIGNANT NEOPLASM OF BREAST: ICD-10-CM

## 2025-02-27 DIAGNOSIS — Z01.419 WELL WOMAN EXAM: Primary | ICD-10-CM

## 2025-02-27 DIAGNOSIS — N95.1 MENOPAUSAL SYMPTOMS: ICD-10-CM

## 2025-02-27 DIAGNOSIS — Z78.0 MENOPAUSE: ICD-10-CM

## 2025-02-27 DIAGNOSIS — Z13.820 SCREENING FOR OSTEOPOROSIS: ICD-10-CM

## 2025-02-27 RX ORDER — ESTRADIOL 0.1 MG/G
1 CREAM VAGINAL 2 TIMES WEEKLY
Qty: 42.5 G | Refills: 4 | Status: SHIPPED | OUTPATIENT
Start: 2025-02-27

## 2025-03-10 ENCOUNTER — HOSPITAL ENCOUNTER (OUTPATIENT)
Dept: BONE DENSITY | Facility: HOSPITAL | Age: 65
Discharge: HOME | End: 2025-03-10
Admitting: NURSE PRACTITIONER
Payer: MEDICARE

## 2025-03-10 DIAGNOSIS — Z78.0 MENOPAUSE: ICD-10-CM

## 2025-03-10 DIAGNOSIS — Z13.820 SCREENING FOR OSTEOPOROSIS: ICD-10-CM

## 2025-03-10 PROCEDURE — 77080 DXA BONE DENSITY AXIAL: CPT
